# Patient Record
(demographics unavailable — no encounter records)

---
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
## 2024-12-19 NOTE — PLAN
[FreeTextEntry1] : Ms. KHALIL  was told significance of findings, options, risks and benefits were explained.  Informed consent for laparoscopic/possible open  incarcerated umbilical hernia repair , and potential risks, benefits and alternatives (surgical options were discussed including non-surgical options or the option of no surgery) to the planned surgery were discussed in depth.  All surgical options were discussed including non-surgical treatments.  She wishes to proceed with surgery.  We will plan for surgery on at the next available date, pending any required insurance pre-certification or pre-approval. She agrees to obtain any necessary pre-operative evaluations and testing prior to surgery. Patient advised to seek immediate medical attention with any acute change in symptoms or with the development of any new or worsening symptoms.  Patient's questions and concerns addressed to patient's satisfaction, and patient verbalized an understanding of the information discussed.
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
## 2025-01-29 NOTE — HISTORY OF PRESENT ILLNESS
[de-identified] : Ms. KHALIL  is s/p Laparoscopic repair of incarcerated ventral and umbilical hernias on 01/22/2025
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
## 2025-02-06 NOTE — HISTORY OF PRESENT ILLNESS
[de-identified] : Ms. KHALIL  is s/p Laparoscopic repair of incarcerated ventral and umbilical hernias on 01/22/2025. Today Ms. KHALIL offers no complaints. patient reports no fever, chills,  or  pain. Her  surgical wounds are  healing well. No signs of inflammation, infection or exudate. Patient reports good bowel movements and appetite.

## 2025-02-06 NOTE — HISTORY OF PRESENT ILLNESS
[de-identified] : Ms. KHALIL  is s/p Laparoscopic repair of incarcerated ventral and umbilical hernias on 01/22/2025. Today Ms. KHALIL offers no complaints. patient reports no fever, chills,  or  pain. Her  surgical wounds are  healing well. No signs of inflammation, infection or exudate. Patient reports good bowel movements and appetite.

## 2025-02-06 NOTE — ASSESSMENT
[FreeTextEntry1] : Ms. KHALIL is doing well, with excellent post-operative recovery. All surgical incisions are healing well and as expected. There is no evidence of infection or complication, and she is progressing as expected. Post-operative wound care, activity, restrictions and precautions reinforced. Patient instructed to refrain from any heavy lifting greater than 10-15 pounds for at least 4-6 weeks post-operatively. Patient's questions and concerns addressed to patient's satisfaction.